# Patient Record
Sex: MALE | Race: WHITE | NOT HISPANIC OR LATINO
[De-identification: names, ages, dates, MRNs, and addresses within clinical notes are randomized per-mention and may not be internally consistent; named-entity substitution may affect disease eponyms.]

---

## 2017-04-11 ENCOUNTER — TRANSCRIPTION ENCOUNTER (OUTPATIENT)
Age: 72
End: 2017-04-11

## 2017-05-17 ENCOUNTER — TRANSCRIPTION ENCOUNTER (OUTPATIENT)
Age: 72
End: 2017-05-17

## 2017-06-22 ENCOUNTER — TRANSCRIPTION ENCOUNTER (OUTPATIENT)
Age: 72
End: 2017-06-22

## 2018-10-25 ENCOUNTER — TRANSCRIPTION ENCOUNTER (OUTPATIENT)
Age: 73
End: 2018-10-25

## 2018-11-18 ENCOUNTER — TRANSCRIPTION ENCOUNTER (OUTPATIENT)
Age: 73
End: 2018-11-18

## 2020-10-24 ENCOUNTER — TRANSCRIPTION ENCOUNTER (OUTPATIENT)
Age: 75
End: 2020-10-24

## 2022-02-16 ENCOUNTER — TRANSCRIPTION ENCOUNTER (OUTPATIENT)
Age: 77
End: 2022-02-16

## 2022-04-09 ENCOUNTER — TRANSCRIPTION ENCOUNTER (OUTPATIENT)
Age: 77
End: 2022-04-09

## 2022-06-17 PROBLEM — Z00.00 ENCOUNTER FOR PREVENTIVE HEALTH EXAMINATION: Status: ACTIVE | Noted: 2022-06-17

## 2022-06-19 ENCOUNTER — NON-APPOINTMENT (OUTPATIENT)
Age: 77
End: 2022-06-19

## 2022-06-21 ENCOUNTER — APPOINTMENT (OUTPATIENT)
Dept: SURGERY | Facility: CLINIC | Age: 77
End: 2022-06-21
Payer: MEDICARE

## 2022-06-21 VITALS
SYSTOLIC BLOOD PRESSURE: 121 MMHG | BODY MASS INDEX: 27.32 KG/M2 | TEMPERATURE: 98.2 F | HEART RATE: 55 BPM | WEIGHT: 164 LBS | HEIGHT: 65 IN | DIASTOLIC BLOOD PRESSURE: 64 MMHG

## 2022-06-21 DIAGNOSIS — K40.90 UNILATERAL INGUINAL HERNIA, W/OUT OBSTRUCTION OR GANGRENE, NOT SPECIFIED AS RECURRENT: ICD-10-CM

## 2022-06-21 PROCEDURE — 99204 OFFICE O/P NEW MOD 45 MIN: CPT

## 2022-06-21 RX ORDER — ALBUTEROL SULFATE 90 UG/1
INHALANT RESPIRATORY (INHALATION)
Refills: 0 | Status: ACTIVE | COMMUNITY

## 2022-06-21 RX ORDER — SILDENAFIL CITRATE 100 MG/1
100 TABLET, FILM COATED ORAL
Refills: 0 | Status: ACTIVE | COMMUNITY

## 2022-06-21 RX ORDER — AMLODIPINE BESYLATE 5 MG/1
5 TABLET ORAL
Refills: 0 | Status: ACTIVE | COMMUNITY

## 2022-06-21 RX ORDER — METOPROLOL SUCCINATE 100 MG/1
TABLET, EXTENDED RELEASE ORAL
Refills: 0 | Status: ACTIVE | COMMUNITY

## 2022-06-21 RX ORDER — MONTELUKAST SODIUM 10 MG/1
10 TABLET, FILM COATED ORAL
Refills: 0 | Status: ACTIVE | COMMUNITY

## 2022-06-21 RX ORDER — ESOMEPRAZOLE MAGNESIUM 40 MG/1
CAPSULE, DELAYED RELEASE ORAL
Refills: 0 | Status: ACTIVE | COMMUNITY

## 2022-06-21 RX ORDER — ROSUVASTATIN CALCIUM 5 MG/1
TABLET, FILM COATED ORAL
Refills: 0 | Status: ACTIVE | COMMUNITY

## 2022-06-21 RX ORDER — BUPROPION HYDROCHLORIDE 150 MG/1
150 TABLET, EXTENDED RELEASE ORAL
Refills: 0 | Status: ACTIVE | COMMUNITY

## 2022-06-21 RX ORDER — FLUTICASONE PROPION/SALMETEROL 250-50 MCG
250-50 BLISTER, WITH INHALATION DEVICE INHALATION
Refills: 0 | Status: ACTIVE | COMMUNITY

## 2022-06-21 NOTE — PHYSICAL EXAM
[Normal Breath Sounds] : Normal breath sounds [Normal Heart Sounds] : normal heart sounds [No Rash or Lesion] : No rash or lesion [Alert] : alert [Oriented to Person] : oriented to person [Oriented to Place] : oriented to place [Oriented to Time] : oriented to time [Calm] : calm [de-identified] : NAD [de-identified] : bilateral reducible inguinal hernias,

## 2022-06-21 NOTE — CONSULT LETTER
[Dear  ___] : Dear ~MIGUEL, [Consult Letter:] : I had the pleasure of evaluating your patient, [unfilled]. [Please see my note below.] : Please see my note below.

## 2022-06-21 NOTE — ASSESSMENT
[FreeTextEntry1] : bilateral inguinal hernias, minimla symptomatic \par options discussed, watchful waiting vs repair (robotically assisted laparoscopic bilateral inguinal hernia repairs with mesh, possible open would be the recommendation).He understands his choices and is likely to proceed with surgery. The risks benefits and alternatives were discussed and the patient agrees to the described plan.\par \par

## 2022-06-21 NOTE — PLAN
[FreeTextEntry1] : He will schedule the surgery and undergo PST/Covid testing prior once he has made his decision. \par \par

## 2022-06-21 NOTE — HISTORY OF PRESENT ILLNESS
[de-identified] : The patient is referred by Dianna Fischer NP for evaluation and discussion regarding bilateral inguinal hernias that was diagnosed recently on a routine exam . He had noticed some groin discomfort when performing more strenuous tasks. He has no bulges and is in no significant discomfort. He was told to consult with me for his options.No GI/ issues. \par

## 2022-07-18 ENCOUNTER — NON-APPOINTMENT (OUTPATIENT)
Age: 77
End: 2022-07-18

## 2022-09-23 ENCOUNTER — APPOINTMENT (OUTPATIENT)
Dept: SURGERY | Facility: HOSPITAL | Age: 77
End: 2022-09-23

## 2022-09-27 ENCOUNTER — APPOINTMENT (OUTPATIENT)
Dept: SURGERY | Facility: CLINIC | Age: 77
End: 2022-09-27

## 2022-09-27 NOTE — HISTORY OF PRESENT ILLNESS
[de-identified] : Pt s/p robotic bilateral inguina hernia repairs with mesh on 9/23/22. Went into postop retention, was active during the [facility] only 2 days later, now feel pain and discmofrt. Has consuelo tremoved

## 2022-10-04 ENCOUNTER — APPOINTMENT (OUTPATIENT)
Dept: SURGERY | Facility: CLINIC | Age: 77
End: 2022-10-04

## 2022-10-04 VITALS
BODY MASS INDEX: 27.16 KG/M2 | RESPIRATION RATE: 18 BRPM | TEMPERATURE: 97.3 F | SYSTOLIC BLOOD PRESSURE: 122 MMHG | HEART RATE: 65 BPM | WEIGHT: 163 LBS | HEIGHT: 65 IN | DIASTOLIC BLOOD PRESSURE: 68 MMHG

## 2022-10-04 PROCEDURE — 99024 POSTOP FOLLOW-UP VISIT: CPT

## 2022-10-04 NOTE — ASSESSMENT
[FreeTextEntry1] : improved since hernia repair and urinary retention which required Veras catheter placement, now urinating normally, no issues, healing well otherwise, expected soreness and bruising but this too is improving, to f/u PRN,  to see Dr Adame or Loraine for urologic issues if they persist.

## 2022-10-18 ENCOUNTER — APPOINTMENT (OUTPATIENT)
Dept: SURGERY | Facility: CLINIC | Age: 77
End: 2022-10-18

## 2022-10-18 DIAGNOSIS — Z09 ENCOUNTER FOR FOLLOW-UP EXAMINATION AFTER COMPLETED TREATMENT FOR CONDITIONS OTHER THAN MALIGNANT NEOPLASM: ICD-10-CM

## 2022-10-18 NOTE — HISTORY OF PRESENT ILLNESS
[de-identified] : Pt returns for evaluation of some groin  discomfort. He had robotic [facility]HR 9/23/22 which seemed to resolve his preop  Went into urinary retention postop , required a cordero catheter. now having some discomfort.

## 2022-10-18 NOTE — PHYSICAL EXAM
[Normal Breath Sounds] : Normal breath sounds [Normal Heart Sounds] : normal heart sounds [No Rash or Lesion] : No rash or lesion [Alert] : alert [Oriented to Person] : oriented to person [Oriented to Place] : oriented to place [Oriented to Time] : oriented to time [Calm] : calm [de-identified] : NAD [de-identified] : benign, soft, no recurrence

## 2022-12-05 ENCOUNTER — NON-APPOINTMENT (OUTPATIENT)
Age: 77
End: 2022-12-05

## 2023-04-06 ENCOUNTER — NON-APPOINTMENT (OUTPATIENT)
Age: 78
End: 2023-04-06

## 2023-05-10 ENCOUNTER — OFFICE (OUTPATIENT)
Dept: URBAN - METROPOLITAN AREA CLINIC 122 | Facility: CLINIC | Age: 78
Setting detail: OPHTHALMOLOGY
End: 2023-05-10
Payer: MEDICARE

## 2023-05-10 DIAGNOSIS — Z96.1: ICD-10-CM

## 2023-05-10 DIAGNOSIS — H35.363: ICD-10-CM

## 2023-05-10 DIAGNOSIS — H02.831: ICD-10-CM

## 2023-05-10 DIAGNOSIS — H40.033: ICD-10-CM

## 2023-05-10 DIAGNOSIS — H35.033: ICD-10-CM

## 2023-05-10 DIAGNOSIS — H52.4: ICD-10-CM

## 2023-05-10 DIAGNOSIS — H02.834: ICD-10-CM

## 2023-05-10 PROCEDURE — 92014 COMPRE OPH EXAM EST PT 1/>: CPT | Performed by: OPHTHALMOLOGY

## 2023-05-10 PROCEDURE — 92015 DETERMINE REFRACTIVE STATE: CPT | Performed by: OPHTHALMOLOGY

## 2023-05-10 PROCEDURE — 92134 CPTRZ OPH DX IMG PST SGM RTA: CPT | Performed by: OPHTHALMOLOGY

## 2023-05-10 ASSESSMENT — TONOMETRY
OD_IOP_MMHG: 20
OS_IOP_MMHG: 14

## 2023-05-10 ASSESSMENT — REFRACTION_MANIFEST
OD_SPHERE: +0.75
OS_AXIS: 60
OS_SPHERE: +0.75
OS_SPHERE: +0.75
OS_ADD: +2.50
OS_SPHERE: +1.25
OS_VA1: 20/30
OD_ADD: +2.50
OD_AXIS: 160
OS_ADD: +2.75
OS_CYLINDER: -0.75
OS_CYLINDER: -1.25
OS_AXIS: 60
OS_CYLINDER: -1.00
OS_AXIS: 60
OD_CYLINDER: -1.25
OD_CYLINDER: -1.25
OD_AXIS: 160
OS_VA1: 20/30
OD_SPHERE: +0.25
OD_VA1: 20/25
OD_ADD: +2.75
OD_VA1: 20/25
OS_VA1: 20/25
OD_VA1: 20/20
OD_CYLINDER: -1.00
OD_SPHERE: +0.25
OD_AXIS: 160
OD_ADD: +2.50
OS_ADD: +2.50

## 2023-05-10 ASSESSMENT — SPHEQUIV_DERIVED
OS_SPHEQUIV: 0.375
OS_SPHEQUIV: 0.25
OD_SPHEQUIV: 0.5
OD_SPHEQUIV: -0.375
OD_SPHEQUIV: -0.25
OS_SPHEQUIV: 0.625
OS_SPHEQUIV: 0.5
OD_SPHEQUIV: 0.125

## 2023-05-10 ASSESSMENT — REFRACTION_CURRENTRX
OD_OVR_VA: 20/
OS_ADD: +2.50
OD_CYLINDER: -1.25
OS_OVR_VA: 20/
OD_ADD: +2.50
OD_AXIS: 150
OS_SPHERE: +0.75
OD_SPHERE: +0.75
OS_CYLINDER: -0.75
OS_AXIS: 62

## 2023-05-10 ASSESSMENT — CORNEAL EDEMA - FOLDS/STRIAE: OD_FOLDSSTRIAE: 1+

## 2023-05-10 ASSESSMENT — VISUAL ACUITY
OD_BCVA: 20/30
OS_BCVA: 20/20

## 2023-05-10 ASSESSMENT — REFRACTION_AUTOREFRACTION
OD_AXIS: 170
OD_CYLINDER: -1.00
OS_CYLINDER: -1.00
OS_SPHERE: +1.00
OD_SPHERE: +1.00
OS_AXIS: 80

## 2023-05-10 ASSESSMENT — CONFRONTATIONAL VISUAL FIELD TEST (CVF)
OD_FINDINGS: FULL
OS_FINDINGS: FULL

## 2023-06-28 ENCOUNTER — OFFICE (OUTPATIENT)
Dept: URBAN - METROPOLITAN AREA CLINIC 122 | Facility: CLINIC | Age: 78
Setting detail: OPHTHALMOLOGY
End: 2023-06-28
Payer: MEDICARE

## 2023-06-28 DIAGNOSIS — H52.7: ICD-10-CM

## 2023-06-28 PROCEDURE — 401 NO CHARGE VISIT: Performed by: OPHTHALMOLOGY

## 2023-06-28 ASSESSMENT — REFRACTION_CURRENTRX
OS_SPHERE: +0.75
OS_ADD: +2.50
OS_AXIS: 60
OS_CYLINDER: -0.75
OD_SPHERE: +0.75
OD_OVR_VA: 20/
OS_OVR_VA: 20/
OD_CYLINDER: -1.00
OD_ADD: +2.50
OD_OVR_VA: 20/
OD_SPHERE: +0.25
OD_AXIS: 150
OD_CYLINDER: -1.25
OS_SPHERE: +2.75
OS_SPHERE: +0.75
OS_OVR_VA: 20/
OD_SPHERE: +2.75
OS_OVR_VA: 20/
OS_AXIS: 62
OD_OVR_VA: 20/
OD_AXIS: 160
OS_ADD: +2.75
OD_ADD: +2.75
OS_CYLINDER: -1.00

## 2023-06-28 ASSESSMENT — SPHEQUIV_DERIVED
OD_SPHEQUIV: 0
OD_SPHEQUIV: -0.25
OS_SPHEQUIV: 0.375
OS_SPHEQUIV: 1.125
OS_SPHEQUIV: 0.5
OD_SPHEQUIV: 0.75
OD_SPHEQUIV: 0.125
OS_SPHEQUIV: 0.625
OD_SPHEQUIV: -0.375
OS_SPHEQUIV: 0.25

## 2023-06-28 ASSESSMENT — REFRACTION_MANIFEST
OS_SPHERE: +1.25
OS_ADD: +2.50
OS_VA1: 20/30
OS_ADD: +2.50
OD_VA1: 20/25
OD_AXIS: 160
OS_CYLINDER: -1.00
OS_CYLINDER: -1.25
OD_VA1: 20/20
OS_SPHERE: +1.00
OS_VA1: 20/30
OS_SPHERE: +0.75
OD_CYLINDER: -1.25
OD_AXIS: 160
OD_ADD: +2.50
OS_AXIS: 60
OD_SPHERE: +0.50
OD_CYLINDER: -1.00
OS_VA1: 20/25
OD_CYLINDER: -1.00
OD_SPHERE: +0.75
OD_VA1: 20/20
OD_AXIS: 150
OS_CYLINDER: -1.00
OD_SPHERE: +0.25
OS_SPHERE: +0.75
OS_AXIS: 65
OS_ADD: +2.50
OS_ADD: +2.75
OS_VA1: 20/30+
OS_CYLINDER: -0.75
OD_AXIS: 160
OD_CYLINDER: -1.25
OD_SPHERE: +0.25
OD_ADD: +2.50
OD_ADD: +2.75
OS_AXIS: 60
OS_AXIS: 60
OD_ADD: +2.50
OD_VA1: 20/25

## 2023-06-28 ASSESSMENT — REFRACTION_AUTOREFRACTION
OS_SPHERE: +1.50
OD_SPHERE: +1.25
OD_CYLINDER: -1.00
OS_AXIS: 79
OS_CYLINDER: -0.75
OD_AXIS: 152

## 2023-06-28 ASSESSMENT — VISUAL ACUITY
OD_BCVA: 20/30
OS_BCVA: 20/25

## 2023-07-31 ENCOUNTER — OFFICE (OUTPATIENT)
Dept: URBAN - METROPOLITAN AREA CLINIC 122 | Facility: CLINIC | Age: 78
Setting detail: OPHTHALMOLOGY
End: 2023-07-31
Payer: MEDICARE

## 2023-07-31 DIAGNOSIS — H52.7: ICD-10-CM

## 2023-07-31 PROCEDURE — 401 NO CHARGE VISIT: Performed by: OPHTHALMOLOGY

## 2023-07-31 ASSESSMENT — REFRACTION_MANIFEST
OS_CYLINDER: -1.25
OS_VA1: 20/25
OD_SPHERE: +0.50
OS_CYLINDER: -1.00
OS_ADD: +2.50
OS_VA1: 20/30
OD_AXIS: 160
OD_VA1: 20/20
OD_AXIS: 145
OS_SPHERE: +1.25
OD_VA1: 20/25
OD_CYLINDER: -1.25
OD_VA1: 20/20
OD_SPHERE: +0.75
OS_AXIS: 60
OD_VA1: 20/20
OD_ADD: +2.50
OD_ADD: +2.75
OS_VA1: 20/30+
OD_SPHERE: +0.25
OD_AXIS: 160
OD_AXIS: 160
OD_CYLINDER: -1.00
OD_SPHERE: +0.25
OS_AXIS: 60
OS_SPHERE: +1.50
OD_VA1: 20/25
OS_CYLINDER: -1.00
OD_CYLINDER: -1.00
OD_CYLINDER: -1.00
OS_SPHERE: +1.00
OS_SPHERE: +0.75
OS_AXIS: 75
OS_AXIS: 65
OS_AXIS: 60
OS_ADD: +2.75
OS_VA1: 20/30
OS_CYLINDER: -0.75
OS_ADD: +2.50
OD_CYLINDER: -1.25
OS_CYLINDER: -1.00
OD_ADD: +2.50
OS_ADD: +2.50
OD_SPHERE: +0.75
OD_ADD: +2.50
OD_AXIS: 150
OS_VA1: 20/30
OS_ADD: +2.50
OD_ADD: +2.50
OS_SPHERE: +0.75

## 2023-07-31 ASSESSMENT — SPHEQUIV_DERIVED
OS_SPHEQUIV: 0.375
OD_SPHEQUIV: 0.25
OS_SPHEQUIV: 0.5
OS_SPHEQUIV: 1
OD_SPHEQUIV: -0.375
OD_SPHEQUIV: -0.25
OD_SPHEQUIV: 0
OS_SPHEQUIV: 0.625
OD_SPHEQUIV: 0.125
OS_SPHEQUIV: 1.25
OS_SPHEQUIV: 0.25
OD_SPHEQUIV: 1

## 2023-07-31 ASSESSMENT — REFRACTION_CURRENTRX
OD_SPHERE: +0.75
OS_SPHERE: +0.75
OD_AXIS: 150
OS_OVR_VA: 20/
OD_CYLINDER: -1.25
OD_SPHERE: +2.75
OS_SPHERE: +0.75
OD_ADD: +2.75
OD_AXIS: 169
OS_OVR_VA: 20/
OS_AXIS: 62
OS_SPHERE: +2.75
OS_CYLINDER: -0.75
OD_SPHERE: +0.25
OS_AXIS: 62
OS_SPHERE: +0.75
OS_ADD: +2.75
OD_OVR_VA: 20/
OD_SPHERE: +0.75
OS_CYLINDER: -0.75
OD_CYLINDER: -1.25
OD_OVR_VA: 20/
OD_OVR_VA: 20/
OD_CYLINDER: -1.00
OS_CYLINDER: -1.00
OD_AXIS: 160
OD_ADD: +2.50
OS_ADD: +2.50
OS_AXIS: 60
OS_OVR_VA: 20/

## 2023-07-31 ASSESSMENT — REFRACTION_AUTOREFRACTION
OD_AXIS: 151
OS_CYLINDER: -1.00
OD_SPHERE: +1.50
OD_CYLINDER: -1.00
OS_AXIS: 78
OS_SPHERE: +1.75

## 2023-07-31 ASSESSMENT — VISUAL ACUITY
OS_BCVA: 20/25
OD_BCVA: 20/30

## 2024-07-30 ENCOUNTER — OFFICE (OUTPATIENT)
Dept: URBAN - METROPOLITAN AREA CLINIC 122 | Facility: CLINIC | Age: 79
Setting detail: OPHTHALMOLOGY
End: 2024-07-30
Payer: MEDICARE

## 2024-07-30 DIAGNOSIS — H35.363: ICD-10-CM

## 2024-07-30 DIAGNOSIS — H11.153: ICD-10-CM

## 2024-07-30 DIAGNOSIS — H02.831: ICD-10-CM

## 2024-07-30 DIAGNOSIS — H10.45: ICD-10-CM

## 2024-07-30 DIAGNOSIS — H40.033: ICD-10-CM

## 2024-07-30 DIAGNOSIS — H02.834: ICD-10-CM

## 2024-07-30 DIAGNOSIS — H35.033: ICD-10-CM

## 2024-07-30 DIAGNOSIS — Z96.1: ICD-10-CM

## 2024-07-30 PROCEDURE — 92250 FUNDUS PHOTOGRAPHY W/I&R: CPT | Performed by: OPHTHALMOLOGY

## 2024-07-30 PROCEDURE — 92014 COMPRE OPH EXAM EST PT 1/>: CPT | Performed by: OPHTHALMOLOGY

## 2024-07-30 ASSESSMENT — CONFRONTATIONAL VISUAL FIELD TEST (CVF)
OS_FINDINGS: FULL
OD_FINDINGS: FULL

## 2024-12-16 ENCOUNTER — NON-APPOINTMENT (OUTPATIENT)
Age: 79
End: 2024-12-16

## 2025-04-05 ENCOUNTER — NON-APPOINTMENT (OUTPATIENT)
Age: 80
End: 2025-04-05

## 2025-04-26 ENCOUNTER — NON-APPOINTMENT (OUTPATIENT)
Age: 80
End: 2025-04-26

## 2025-05-21 ENCOUNTER — OFFICE (OUTPATIENT)
Dept: URBAN - METROPOLITAN AREA CLINIC 122 | Facility: CLINIC | Age: 80
Setting detail: OPHTHALMOLOGY
End: 2025-05-21
Payer: MEDICARE

## 2025-05-21 DIAGNOSIS — H11.153: ICD-10-CM

## 2025-05-21 DIAGNOSIS — H16.222: ICD-10-CM

## 2025-05-21 DIAGNOSIS — H10.45: ICD-10-CM

## 2025-05-21 DIAGNOSIS — S05.02XA: ICD-10-CM

## 2025-05-21 DIAGNOSIS — H02.831: ICD-10-CM

## 2025-05-21 DIAGNOSIS — H02.834: ICD-10-CM

## 2025-05-21 DIAGNOSIS — Z96.1: ICD-10-CM

## 2025-05-21 DIAGNOSIS — H40.033: ICD-10-CM

## 2025-05-21 PROCEDURE — 99213 OFFICE O/P EST LOW 20 MIN: CPT | Performed by: OPHTHALMOLOGY

## 2025-05-21 ASSESSMENT — REFRACTION_MANIFEST
OS_CYLINDER: -1.25
OD_VA1: 20/25
OS_AXIS: 60
OS_CYLINDER: -1.00
OD_AXIS: 160
OS_SPHERE: +1.00
OD_SPHERE: +0.75
OD_VA1: 20/20
OD_AXIS: 145
OS_VA1: 20/30
OD_VA1: 20/25
OS_AXIS: 60
OS_CYLINDER: -1.00
OD_CYLINDER: -1.00
OS_SPHERE: +0.75
OD_ADD: +2.50
OD_ADD: +2.75
OD_CYLINDER: -1.00
OD_SPHERE: +0.50
OS_VA1: 20/30
OS_ADD: +2.50
OS_SPHERE: +0.75
OD_ADD: +2.50
OD_SPHERE: +0.25
OS_CYLINDER: -0.75
OD_SPHERE: +0.25
OS_VA1: 20/25
OD_AXIS: 160
OD_AXIS: 150
OD_ADD: +2.50
OS_ADD: +2.75
OS_ADD: +2.50
OD_VA1: 20/20
OS_SPHERE: +1.50
OD_SPHERE: +0.75
OD_ADD: +2.50
OS_VA1: 20/30+
OD_AXIS: 160
OD_CYLINDER: -1.25
OD_VA1: 20/20
OS_AXIS: 65
OS_ADD: +2.50
OS_AXIS: 75
OD_CYLINDER: -1.00
OS_ADD: +2.50
OS_VA1: 20/30
OD_CYLINDER: -1.25
OS_AXIS: 60
OS_CYLINDER: -1.00
OS_SPHERE: +1.25

## 2025-05-21 ASSESSMENT — REFRACTION_CURRENTRX
OS_OVR_VA: 20/
OS_SPHERE: +0.75
OD_SPHERE: +0.25
OD_SPHERE: +2.75
OS_AXIS: 62
OD_AXIS: 169
OD_ADD: +2.75
OS_ADD: +2.50
OD_AXIS: 150
OD_CYLINDER: -1.25
OS_OVR_VA: 20/
OD_CYLINDER: -1.25
OS_CYLINDER: -0.75
OS_SPHERE: +0.75
OS_ADD: +2.75
OS_AXIS: 62
OD_OVR_VA: 20/
OS_OVR_VA: 20/
OD_AXIS: 160
OD_SPHERE: +0.75
OD_CYLINDER: -1.00
OS_CYLINDER: -1.00
OS_SPHERE: +2.75
OS_SPHERE: +0.75
OS_AXIS: 60
OS_CYLINDER: -0.75
OD_ADD: +2.50
OD_OVR_VA: 20/
OD_SPHERE: +0.75
OD_OVR_VA: 20/

## 2025-05-21 ASSESSMENT — REFRACTION_AUTOREFRACTION
OS_AXIS: 78
OD_CYLINDER: -0.75
OS_SPHERE: +1.75
OS_CYLINDER: -1.50
OD_AXIS: 145
OD_SPHERE: +1.25

## 2025-05-21 ASSESSMENT — VISUAL ACUITY
OD_BCVA: 20/30
OS_BCVA: 20/20-2

## 2025-05-21 ASSESSMENT — CORNEAL EDEMA - FOLDS/STRIAE: OD_FOLDSSTRIAE: 1+

## 2025-05-21 ASSESSMENT — SUPERFICIAL PUNCTATE KERATITIS (SPK): OS_SPK: 3+

## 2025-05-21 ASSESSMENT — TONOMETRY
OD_IOP_MMHG: 16
OS_IOP_MMHG: 16

## 2025-05-21 ASSESSMENT — CORNEAL TRAUMA - ABRASION: OS_ABRASION: -VE INFILTRATE PRESENT

## 2025-06-08 ENCOUNTER — NON-APPOINTMENT (OUTPATIENT)
Age: 80
End: 2025-06-08

## 2025-08-06 ENCOUNTER — OFFICE (OUTPATIENT)
Dept: URBAN - METROPOLITAN AREA CLINIC 122 | Facility: CLINIC | Age: 80
Setting detail: OPHTHALMOLOGY
End: 2025-08-06
Payer: MEDICARE

## 2025-08-06 DIAGNOSIS — H40.033: ICD-10-CM

## 2025-08-06 DIAGNOSIS — H02.834: ICD-10-CM

## 2025-08-06 DIAGNOSIS — E11.9: ICD-10-CM

## 2025-08-06 DIAGNOSIS — H10.45: ICD-10-CM

## 2025-08-06 DIAGNOSIS — H35.033: ICD-10-CM

## 2025-08-06 DIAGNOSIS — Z96.1: ICD-10-CM

## 2025-08-06 DIAGNOSIS — H52.4: ICD-10-CM

## 2025-08-06 DIAGNOSIS — H35.363: ICD-10-CM

## 2025-08-06 DIAGNOSIS — H16.222: ICD-10-CM

## 2025-08-06 DIAGNOSIS — H11.153: ICD-10-CM

## 2025-08-06 DIAGNOSIS — H02.831: ICD-10-CM

## 2025-08-06 PROCEDURE — 92015 DETERMINE REFRACTIVE STATE: CPT | Performed by: OPHTHALMOLOGY

## 2025-08-06 PROCEDURE — 92134 CPTRZ OPH DX IMG PST SGM RTA: CPT | Performed by: OPHTHALMOLOGY

## 2025-08-06 PROCEDURE — 92014 COMPRE OPH EXAM EST PT 1/>: CPT | Performed by: OPHTHALMOLOGY

## 2025-08-06 ASSESSMENT — REFRACTION_MANIFEST
OD_VA1: 20/25
OD_ADD: +2.50
OD_AXIS: 150
OD_AXIS: 160
OS_VA1: 20/25
OD_VA1: 20/25
OD_CYLINDER: -1.25
OD_CYLINDER: -1.00
OD_VA1: 20/20
OS_ADD: +2.50
OS_VA1: 20/30
OS_ADD: +2.50
OS_VA1: 20/30+
OD_CYLINDER: -1.25
OD_CYLINDER: -1.00
OD_SPHERE: +0.25
OS_VA1: 20/30
OS_CYLINDER: -1.25
OS_VA1: 20/30
OS_SPHERE: +0.75
OD_CYLINDER: -1.25
OD_SPHERE: +0.75
OS_SPHERE: +0.75
OS_AXIS: 65
OS_AXIS: 62
OD_ADD: +2.50
OS_AXIS: 60
OS_SPHERE: +0.75
OD_AXIS: 160
OS_SPHERE: +1.00
OD_SPHERE: +0.25
OD_ADD: +2.50
OD_ADD: +2.75
OD_SPHERE: +0.75
OD_VA1: 20/20
OS_CYLINDER: -0.75
OS_ADD: +2.75
OS_ADD: +2.50
OD_AXIS: 160
OS_ADD: +2.50
OD_AXIS: 150
OS_AXIS: 60
OS_CYLINDER: -1.00
OS_CYLINDER: -1.00
OS_SPHERE: +1.25
OD_VA1: 20/20
OS_CYLINDER: -0.75
OD_SPHERE: +0.50
OS_AXIS: 60
OD_ADD: +2.50

## 2025-08-06 ASSESSMENT — REFRACTION_AUTOREFRACTION
OD_AXIS: 145
OS_SPHERE: +1.75
OD_SPHERE: +1.25
OD_CYLINDER: -0.75
OS_CYLINDER: -1.50
OS_AXIS: 78

## 2025-08-06 ASSESSMENT — REFRACTION_CURRENTRX
OS_OVR_VA: 20/
OD_SPHERE: +0.25
OS_OVR_VA: 20/
OD_VPRISM_DIRECTION: BF
OD_SPHERE: +2.75
OD_AXIS: 150
OS_CYLINDER: -0.75
OD_ADD: +2.75
OD_SPHERE: +0.75
OD_OVR_VA: 20/
OS_AXIS: 60
OD_CYLINDER: -1.25
OD_CYLINDER: -1.25
OD_OVR_VA: 20/
OS_OVR_VA: 20/
OD_AXIS: 160
OS_SPHERE: +0.75
OS_VPRISM_DIRECTION: BF
OS_AXIS: 62
OS_ADD: +2.75
OD_CYLINDER: -1.00
OD_AXIS: 169
OD_ADD: +2.50
OS_CYLINDER: -1.00
OS_AXIS: 62
OS_SPHERE: +2.75
OD_OVR_VA: 20/
OS_ADD: +2.50
OD_SPHERE: +0.75
OS_SPHERE: +0.75
OS_CYLINDER: -0.75
OS_SPHERE: +0.75

## 2025-08-06 ASSESSMENT — VISUAL ACUITY
OS_BCVA: 20/20-2
OD_BCVA: 20/30

## 2025-08-06 ASSESSMENT — TONOMETRY
OD_IOP_MMHG: 13
OS_IOP_MMHG: 12

## 2025-08-06 ASSESSMENT — SUPERFICIAL PUNCTATE KERATITIS (SPK): OS_SPK: 3+

## 2025-08-06 ASSESSMENT — CORNEAL EDEMA - FOLDS/STRIAE: OD_FOLDSSTRIAE: 1+

## 2025-08-06 ASSESSMENT — CORNEAL TRAUMA - ABRASION: OS_ABRASION: ABSENT
